# Patient Record
Sex: MALE | Race: WHITE | Employment: FULL TIME | URBAN - METROPOLITAN AREA
[De-identification: names, ages, dates, MRNs, and addresses within clinical notes are randomized per-mention and may not be internally consistent; named-entity substitution may affect disease eponyms.]

---

## 2023-10-08 ENCOUNTER — HOSPITAL ENCOUNTER (EMERGENCY)
Age: 53
Discharge: HOME | End: 2023-10-08
Attending: EMERGENCY MEDICINE
Payer: COMMERCIAL

## 2023-10-08 ENCOUNTER — APPOINTMENT (OUTPATIENT)
Dept: GENERAL RADIOLOGY | Age: 53
End: 2023-10-08
Payer: COMMERCIAL

## 2023-10-08 VITALS
HEART RATE: 93 BPM | BODY MASS INDEX: 18.48 KG/M2 | RESPIRATION RATE: 18 BRPM | WEIGHT: 115 LBS | OXYGEN SATURATION: 99 % | HEIGHT: 66 IN | DIASTOLIC BLOOD PRESSURE: 69 MMHG | SYSTOLIC BLOOD PRESSURE: 127 MMHG | TEMPERATURE: 98.1 F

## 2023-10-08 DIAGNOSIS — I49.3 PVC'S (PREMATURE VENTRICULAR CONTRACTIONS): ICD-10-CM

## 2023-10-08 DIAGNOSIS — R00.2 PALPITATIONS: Primary | ICD-10-CM

## 2023-10-08 LAB
ANION GAP SERPL CALCULATED.3IONS-SCNC: 9 MMOL/L (ref 7–16)
BASOPHILS # BLD: 0.01 K/UL (ref 0–0.2)
BASOPHILS NFR BLD: 0 % (ref 0–2)
BUN SERPL-MCNC: 7 MG/DL (ref 6–20)
CALCIUM SERPL-MCNC: 9.9 MG/DL (ref 8.6–10.2)
CHLORIDE SERPL-SCNC: 102 MMOL/L (ref 98–107)
CO2 SERPL-SCNC: 30 MMOL/L (ref 22–29)
CREAT SERPL-MCNC: 0.7 MG/DL (ref 0.7–1.2)
EOSINOPHIL # BLD: 0 K/UL (ref 0.05–0.5)
EOSINOPHILS RELATIVE PERCENT: 0 % (ref 0–6)
ERYTHROCYTE [DISTWIDTH] IN BLOOD BY AUTOMATED COUNT: 12.6 % (ref 11.5–15)
GFR SERPL CREATININE-BSD FRML MDRD: >60 ML/MIN/1.73M2
GLUCOSE SERPL-MCNC: 149 MG/DL (ref 74–99)
HCT VFR BLD AUTO: 44.7 % (ref 37–54)
HGB BLD-MCNC: 14.9 G/DL (ref 12.5–16.5)
IMM GRANULOCYTES # BLD AUTO: <0.03 K/UL (ref 0–0.58)
IMM GRANULOCYTES NFR BLD: 0 % (ref 0–5)
LYMPHOCYTES NFR BLD: 0.75 K/UL (ref 1.5–4)
LYMPHOCYTES RELATIVE PERCENT: 17 % (ref 20–42)
MCH RBC QN AUTO: 30.8 PG (ref 26–35)
MCHC RBC AUTO-ENTMCNC: 33.3 G/DL (ref 32–34.5)
MCV RBC AUTO: 92.5 FL (ref 80–99.9)
MONOCYTES NFR BLD: 0.31 K/UL (ref 0.1–0.95)
MONOCYTES NFR BLD: 7 % (ref 2–12)
NEUTROPHILS NFR BLD: 76 % (ref 43–80)
NEUTS SEG NFR BLD: 3.33 K/UL (ref 1.8–7.3)
PLATELET # BLD AUTO: 299 K/UL (ref 130–450)
PMV BLD AUTO: 9.7 FL (ref 7–12)
POTASSIUM SERPL-SCNC: 4.2 MMOL/L (ref 3.5–5)
RBC # BLD AUTO: 4.83 M/UL (ref 3.8–5.8)
SODIUM SERPL-SCNC: 141 MMOL/L (ref 132–146)
T4 FREE SERPL-MCNC: 1.5 NG/DL (ref 0.9–1.7)
TROPONIN I SERPL HS-MCNC: <6 NG/L (ref 0–11)
TSH SERPL DL<=0.05 MIU/L-ACNC: 1.74 UIU/ML (ref 0.27–4.2)
WBC OTHER # BLD: 4.4 K/UL (ref 4.5–11.5)

## 2023-10-08 PROCEDURE — 99285 EMERGENCY DEPT VISIT HI MDM: CPT

## 2023-10-08 PROCEDURE — 93005 ELECTROCARDIOGRAM TRACING: CPT | Performed by: EMERGENCY MEDICINE

## 2023-10-08 PROCEDURE — 84443 ASSAY THYROID STIM HORMONE: CPT

## 2023-10-08 PROCEDURE — 85025 COMPLETE CBC W/AUTO DIFF WBC: CPT

## 2023-10-08 PROCEDURE — 71045 X-RAY EXAM CHEST 1 VIEW: CPT

## 2023-10-08 PROCEDURE — 80048 BASIC METABOLIC PNL TOTAL CA: CPT

## 2023-10-08 PROCEDURE — 84439 ASSAY OF FREE THYROXINE: CPT

## 2023-10-08 PROCEDURE — 84484 ASSAY OF TROPONIN QUANT: CPT

## 2023-10-08 ASSESSMENT — ENCOUNTER SYMPTOMS
EYE REDNESS: 0
NAUSEA: 0
ABDOMINAL PAIN: 0
VOMITING: 0
SHORTNESS OF BREATH: 0

## 2023-10-08 ASSESSMENT — PAIN - FUNCTIONAL ASSESSMENT: PAIN_FUNCTIONAL_ASSESSMENT: NONE - DENIES PAIN

## 2023-10-08 NOTE — ED PROVIDER NOTES
[address] ENCOUNTER        Pt Name: Kyra Valentin  MRN: [id number]  [address] [facility] [place] 1970  Date of evaluation: 10/8/2023  Provider: Huan Arevalo DO  PCP: Cory Hernández MD  Note Started: 9:42 AM EDT 10/8/23    CHIEF COMPLAINT       Chief Complaint   Patient presents with    Irregular Heart Beat     Started Tuesday. HISTORY OF PRESENT ILLNESS: 1 or more Elements       Kyra Valentin is a 47 y.o. male who presents to the emergency department with a chief complaint of palpitations. The patient states that he began approximately 5 days ago with palpitations. He denies any particular chest pain. States he did have a very minor pain in the left upper quadrant of his abdomen and left lower chest which has resolved after only few seconds a few days ago. He states that the palpitations are intermittent. Nothing in particular makes them better or worse. He denies any associated shortness of breath. He denies any fevers, chills, nausea, vomiting. The patient states that he does drink a large amount of caffeine. He has no thyroid pathology. He has no history of hypertension, hyperlipidemia or diabetes. He is not a smoker. Nursing Notes were all reviewed and agreed with or any disagreements were addressed in the HPI. REVIEW OF SYSTEMS :      Review of Systems   Constitutional:  Negative for fever. HENT:  Negative for congestion. Eyes:  Negative for redness. Respiratory:  Negative for shortness of breath. Cardiovascular:  Positive for palpitations. Negative for chest pain. Gastrointestinal:  Negative for abdominal pain, nausea and vomiting. Genitourinary:  Negative for dysuria. Musculoskeletal:  Negative for arthralgias. Skin:  Negative for rash. Neurological:  Negative for dizziness. Psychiatric/Behavioral:  Negative for confusion.     All other systems reviewed and are

## 2023-10-09 LAB
EKG ATRIAL RATE: 83 BPM
EKG P AXIS: 82 DEGREES
EKG P-R INTERVAL: 110 MS
EKG Q-T INTERVAL: 360 MS
EKG QRS DURATION: 88 MS
EKG QTC CALCULATION (BAZETT): 423 MS
EKG R AXIS: 76 DEGREES
EKG T AXIS: 59 DEGREES
EKG VENTRICULAR RATE: 83 BPM

## 2023-10-09 PROCEDURE — 93010 ELECTROCARDIOGRAM REPORT: CPT | Performed by: INTERNAL MEDICINE

## 2024-01-17 NOTE — PROGRESS NOTES
[facility]IC[facility]- [facility] Electrophysiology  Consultation Report  PATIENT: Ari Etienne  MEDICAL RECORD NUMBER: [id number]  DATE OF SERVICE:  2024  ATTENDING ELECTROPHYSIOLOGIST: Lani Ashton MD  REFERRING PHYSICIAN:  Shabbir Arias MD  CHIEF COMPLAINT: Palpitations    HPI: This is a 53 y.o. male with a history of   Patient Active Problem List   Diagnosis    Thumb injury    Hand contusion    Fracture of finger, distal phalanx, left, closed   who presents to cardiac electrophysiology clinic for consultation of palpitations.  The patient states that his palpitations started in 2023 which was couple of days before he presented to the emergency room on  10/8/23 with complaints of palpitations.  An EKG at that time showed frequent PVCs. He was discharged home and was advised to follow up with PCP.  He reports feeling his palpitation went away after 2023 but had gradually increased in occurrence since early 2024. His episodes are sporadic and unaware of any notable triggers. He does report associated SOB during his episodes. He reports he has decreased his caffeine intake from 2 to 1 cup per day, which he reports made a difference in his symptoms. The patient presents today in SR w/ freq PVCs. The patient denies any chest pain, dizziness, syncope, orthopnea or paroxysmal nocturnal dyspnea. He denies any SCD in the family but reports maternal grandfather  from [place].       Patient Active Problem List    Diagnosis Date Noted    Thumb injury 2011    Hand contusion 2011    Fracture of finger, distal phalanx, left, closed 2011       History reviewed. No pertinent past medical history.    Family History   Problem Relation Age of Onset    Irritable Bowel Syndrome Father        Social History     Tobacco Use    Smoking status: Never    Smokeless tobacco: Never   Substance Use Topics    Alcohol use: No       No current outpatient

## 2024-01-18 ENCOUNTER — OFFICE VISIT (OUTPATIENT)
Dept: NON INVASIVE DIAGNOSTICS | Age: 54
End: 2024-01-18
Payer: COMMERCIAL

## 2024-01-18 VITALS
HEART RATE: 72 BPM | WEIGHT: 105.4 LBS | BODY MASS INDEX: 16.94 KG/M2 | HEIGHT: 66 IN | SYSTOLIC BLOOD PRESSURE: 102 MMHG | DIASTOLIC BLOOD PRESSURE: 70 MMHG | RESPIRATION RATE: 16 BRPM

## 2024-01-18 DIAGNOSIS — R94.31 EKG ABNORMALITIES: ICD-10-CM

## 2024-01-18 DIAGNOSIS — I49.9 IRREGULAR HEART BEAT: Primary | ICD-10-CM

## 2024-01-18 PROCEDURE — 99205 OFFICE O/P NEW HI 60 MIN: CPT | Performed by: INTERNAL MEDICINE

## 2024-01-18 PROCEDURE — 93000 ELECTROCARDIOGRAM COMPLETE: CPT | Performed by: INTERNAL MEDICINE

## 2024-01-18 NOTE — PROGRESS NOTES
Patient was seen today and ZIO XT 14 day monitor was placed.  Monitor was ordered by Dr Lani Ashton.  Monitor was applied and instructions given to patient.  Patient stated understanding and gave verbalized readback.    Monitor company: [employer] XT 14 day  Serial number : [id number]    Electronically signed by Jerilyn Bryan MA on 1/18/2024 at 8:19 AM

## 2024-01-25 ENCOUNTER — TELEPHONE (OUTPATIENT)
Dept: CARDIOLOGY | Age: 54
End: 2024-01-25

## 2024-01-25 NOTE — TELEPHONE ENCOUNTER
Spoke with patient and confirmed regular stress test appointment on January 29, 2024 at 0715. Instructions for test, and COVID-19 preprocedure information reviewed with patient, questions answered. Patient verbalized understanding. Also instructed to call office if unable to keep appointment.

## 2024-01-29 ENCOUNTER — HOSPITAL ENCOUNTER (OUTPATIENT)
Dept: CARDIOLOGY | Age: 54
Discharge: HOME | End: 2024-01-31
Attending: INTERNAL MEDICINE
Payer: COMMERCIAL

## 2024-01-29 VITALS
SYSTOLIC BLOOD PRESSURE: 102 MMHG | HEIGHT: 66 IN | RESPIRATION RATE: 16 BRPM | WEIGHT: 105 LBS | DIASTOLIC BLOOD PRESSURE: 78 MMHG | HEART RATE: 74 BPM | BODY MASS INDEX: 16.88 KG/M2

## 2024-01-29 LAB
ECHO BSA: 1.49 M2
STRESS BASELINE DIAS BP: 78 MMHG
STRESS BASELINE HR: 71 BPM
STRESS BASELINE SYS BP: 102 MMHG
STRESS ESTIMATED WORKLOAD: 13.3 METS
STRESS EXERCISE DUR MIN: 10 MIN
STRESS EXERCISE DUR SEC: 1 SEC
STRESS O2 SAT PEAK: 97 %
STRESS O2 SAT REST: 98 %
STRESS PEAK DIAS BP: 80 MMHG
STRESS PEAK SYS BP: 156 MMHG
STRESS PERCENT HR ACHIEVED: 96 %
STRESS POST PEAK HR: 162 BPM
STRESS RATE PRESSURE PRODUCT: NORMAL BPM*MMHG
STRESS TARGET HR: 168 BPM

## 2024-01-29 PROCEDURE — 93016 CV STRESS TEST SUPVJ ONLY: CPT | Performed by: INTERNAL MEDICINE

## 2024-01-29 PROCEDURE — 93017 CV STRESS TEST TRACING ONLY: CPT

## 2024-01-29 PROCEDURE — 93018 CV STRESS TEST I&R ONLY: CPT | Performed by: INTERNAL MEDICINE

## 2024-01-30 ENCOUNTER — TELEPHONE (OUTPATIENT)
Dept: NON INVASIVE DIAGNOSTICS | Age: 54
End: 2024-01-30

## 2024-01-30 NOTE — TELEPHONE ENCOUNTER
----- Message from Lani Ashton MD sent at 1/29/2024  5:41 PM EST -----  Stress test is normal. Thanks.  ----- Message -----  From: Esa Best MD  Sent: 1/29/2024   1:53 PM EST  To: Lani Ashton MD

## 2024-02-13 ENCOUNTER — TELEPHONE (OUTPATIENT)
Dept: NON INVASIVE DIAGNOSTICS | Age: 54
End: 2024-02-13

## 2024-02-13 DIAGNOSIS — R94.31 EKG ABNORMALITIES: ICD-10-CM

## 2024-02-13 NOTE — TELEPHONE ENCOUNTER
----- Message from Lani Ashton MD sent at 2/13/2024 11:37 AM EST -----  Predominant underlying rhythm was Sinus Rhythm. 3 Supraventricular Tachycardia runs occurred, the run with the longest lasting 6 beats with an avg rate of 145 bpm. Isolated SVEs were occasional (1.2%, 43077). Isolated VEs were frequent (9.6%, [id number]). No VT or PAF. No  pause or AV block. Reported symptoms were correlated with sinus rhythm, PACs and PVCs. Start Toprol Xl 25 mg daily if he agrees. Thanks.  ----- Message -----  From: Zuleima Faria MA  Sent: 2/13/2024  10:22 AM EST  To: Lani Ashton MD

## 2024-02-13 NOTE — TELEPHONE ENCOUNTER
Pt notified of results and verbalized understanding.  Patient wants to wait until ECHO results to consider starting Toprol XL.     Electronically signed by Mary Faria MA on 2/13/2024 at 12:22 PM

## 2024-02-13 NOTE — RESULT ENCOUNTER NOTE
The patient wants to hold off on starting Toprol XL until further testing is complete. He feels good and episodes seem to have subsided since the holidays are over.     Electronically signed by Mary Faria MA on 2/13/2024 at 12:26 PM

## 2024-03-07 ENCOUNTER — HOSPITAL ENCOUNTER (OUTPATIENT)
Dept: CARDIOLOGY | Age: 54
Discharge: HOME | End: 2024-03-09
Attending: INTERNAL MEDICINE
Payer: COMMERCIAL

## 2024-03-07 VITALS
DIASTOLIC BLOOD PRESSURE: 78 MMHG | WEIGHT: 105 LBS | BODY MASS INDEX: 16.88 KG/M2 | HEIGHT: 66 IN | SYSTOLIC BLOOD PRESSURE: 102 MMHG

## 2024-03-07 LAB
ECHO AO ASC DIAM: 2.9 CM
ECHO AO ASCENDING AORTA INDEX: 1.91 CM/M2
ECHO AV AREA PEAK VELOCITY: 2.5 CM2
ECHO AV AREA VTI: 2.3 CM2
ECHO AV AREA/BSA PEAK VELOCITY: 1.6 CM2/M2
ECHO AV AREA/BSA VTI: 1.5 CM2/M2
ECHO AV CUSP MM: 1.8 CM
ECHO AV MEAN GRADIENT: 2 MMHG
ECHO AV MEAN VELOCITY: 0.7 M/S
ECHO AV PEAK GRADIENT: 4 MMHG
ECHO AV PEAK VELOCITY: 0.9 M/S
ECHO AV VELOCITY RATIO: 0.78
ECHO AV VTI: 21 CM
ECHO BSA: 1.49 M2
ECHO EST RA PRESSURE: 3 MMHG
ECHO LA DIAMETER INDEX: 1.78 CM/M2
ECHO LA DIAMETER: 2.7 CM
ECHO LA VOL A-L A2C: 31 ML (ref 18–58)
ECHO LA VOL A-L A4C: 24 ML (ref 18–58)
ECHO LA VOL MOD A2C: 28 ML (ref 18–58)
ECHO LA VOL MOD A4C: 21 ML (ref 18–58)
ECHO LA VOLUME AREA LENGTH: 30 ML
ECHO LA VOLUME INDEX A-L A2C: 20 ML/M2 (ref 16–34)
ECHO LA VOLUME INDEX A-L A4C: 16 ML/M2 (ref 16–34)
ECHO LA VOLUME INDEX AREA LENGTH: 20 ML/M2 (ref 16–34)
ECHO LA VOLUME INDEX MOD A2C: 18 ML/M2 (ref 16–34)
ECHO LA VOLUME INDEX MOD A4C: 14 ML/M2 (ref 16–34)
ECHO LV FRACTIONAL SHORTENING: 31 % (ref 28–44)
ECHO LV INTERNAL DIMENSION DIASTOLE INDEX: 2.76 CM/M2
ECHO LV INTERNAL DIMENSION DIASTOLIC: 4.2 CM (ref 4.2–5.9)
ECHO LV INTERNAL DIMENSION SYSTOLIC INDEX: 1.91 CM/M2
ECHO LV INTERNAL DIMENSION SYSTOLIC: 2.9 CM
ECHO LV ISOVOLUMETRIC RELAXATION TIME (IVRT): 96.9 MS
ECHO LV IVSD: 0.7 CM (ref 0.6–1)
ECHO LV IVSS: 1 CM
ECHO LV MASS 2D: 85.1 G (ref 88–224)
ECHO LV MASS INDEX 2D: 56 G/M2 (ref 49–115)
ECHO LV POSTERIOR WALL DIASTOLIC: 0.7 CM (ref 0.6–1)
ECHO LV POSTERIOR WALL SYSTOLIC: 1.1 CM
ECHO LV RELATIVE WALL THICKNESS RATIO: 0.33
ECHO LVOT AREA: 3.1 CM2
ECHO LVOT AV VTI INDEX: 0.76
ECHO LVOT DIAM: 2 CM
ECHO LVOT MEAN GRADIENT: 1 MMHG
ECHO LVOT PEAK GRADIENT: 2 MMHG
ECHO LVOT PEAK VELOCITY: 0.7 M/S
ECHO LVOT STROKE VOLUME INDEX: 32.8 ML/M2
ECHO LVOT SV: 49.9 ML
ECHO LVOT VTI: 15.9 CM
ECHO MV "A" WAVE DURATION: 120 MSEC
ECHO MV A VELOCITY: 0.47 M/S
ECHO MV AREA PHT: 2.5 CM2
ECHO MV AREA VTI: 2.7 CM2
ECHO MV E DECELERATION TIME (DT): 208.5 MS
ECHO MV E VELOCITY: 0.71 M/S
ECHO MV E/A RATIO: 1.51
ECHO MV LVOT VTI INDEX: 1.17
ECHO MV MAX VELOCITY: 0.7 M/S
ECHO MV MEAN GRADIENT: 1 MMHG
ECHO MV MEAN VELOCITY: 0.4 M/S
ECHO MV PEAK GRADIENT: 2 MMHG
ECHO MV PRESSURE HALF TIME (PHT): 87.1 MS
ECHO MV VTI: 18.6 CM
ECHO PV MAX VELOCITY: 0.8 M/S
ECHO PV MEAN GRADIENT: 2 MMHG
ECHO PV MEAN VELOCITY: 0.6 M/S
ECHO PV PEAK GRADIENT: 3 MMHG
ECHO PV VTI: 18.5 CM
ECHO RIGHT VENTRICULAR SYSTOLIC PRESSURE (RVSP): 23 MMHG
ECHO RV INTERNAL DIMENSION: 3 CM
ECHO TV REGURGITANT MAX VELOCITY: 2.25 M/S
ECHO TV REGURGITANT PEAK GRADIENT: 20 MMHG

## 2024-03-07 PROCEDURE — 93306 TTE W/DOPPLER COMPLETE: CPT | Performed by: INTERNAL MEDICINE

## 2024-03-07 PROCEDURE — 93306 TTE W/DOPPLER COMPLETE: CPT

## 2024-03-08 ENCOUNTER — TELEPHONE (OUTPATIENT)
Dept: NON INVASIVE DIAGNOSTICS | Age: 54
End: 2024-03-08

## 2024-03-08 RX ORDER — METOPROLOL SUCCINATE 25 MG/1
25 TABLET, EXTENDED RELEASE ORAL DAILY
COMMUNITY
End: 2024-03-08 | Stop reason: SDUPTHER

## 2024-03-08 RX ORDER — METOPROLOL SUCCINATE 25 MG/1
25 TABLET, EXTENDED RELEASE ORAL DAILY
Qty: 90 TABLET | Refills: 1 | Status: SHIPPED | OUTPATIENT
Start: 2024-03-08

## 2024-03-08 NOTE — TELEPHONE ENCOUNTER
----- Message from Lani Ashton MD sent at 3/7/2024  5:56 PM EST -----  LV EF 50% . PVC burden 9.6%. Start Toprol XL 25 mg daily if he agrees. Thanks.  ----- Message -----  From: Daniella Atkinson DO  Sent: 3/7/2024   5:50 PM EST  To: Lani Ashton MD

## 2024-03-08 NOTE — TELEPHONE ENCOUNTER
Pt notified of results and verbalized understanding.    The patient agrees to start the Toprol XL 25 MG QD. The patient requested the medication be sent to [facility] in [place].     Electronically signed by Mary Faria MA on 3/8/2024 at 9:08 AM

## 2024-07-18 ENCOUNTER — OFFICE VISIT (OUTPATIENT)
Dept: NON INVASIVE DIAGNOSTICS | Age: 54
End: 2024-07-18
Payer: COMMERCIAL

## 2024-07-18 VITALS
WEIGHT: 104 LBS | HEART RATE: 50 BPM | OXYGEN SATURATION: 96 % | DIASTOLIC BLOOD PRESSURE: 60 MMHG | HEIGHT: 65 IN | BODY MASS INDEX: 17.33 KG/M2 | SYSTOLIC BLOOD PRESSURE: 90 MMHG | RESPIRATION RATE: 16 BRPM

## 2024-07-18 DIAGNOSIS — I51.9 HEART PROBLEM: Primary | ICD-10-CM

## 2024-07-18 DIAGNOSIS — I49.3 PVC (PREMATURE VENTRICULAR CONTRACTION): ICD-10-CM

## 2024-07-18 PROCEDURE — 99214 OFFICE O/P EST MOD 30 MIN: CPT | Performed by: NURSE PRACTITIONER

## 2024-07-18 PROCEDURE — 93000 ELECTROCARDIOGRAM COMPLETE: CPT | Performed by: INTERNAL MEDICINE

## 2024-07-18 RX ORDER — METOPROLOL SUCCINATE 25 MG/1
25 TABLET, EXTENDED RELEASE ORAL DAILY
Qty: 90 TABLET | Refills: 3 | Status: SHIPPED | OUTPATIENT
Start: 2024-07-18

## 2024-07-18 NOTE — PATIENT INSTRUCTIONS
Continue to take your Toprol prior to bed.  If you note any episodes of lightheadedness and almost passing out please let us known.

## 2024-07-18 NOTE — PROGRESS NOTES
Toprol 25mg nightly.   Ongoing monitoring of LVEF and PVC burden.   Advised to decrease Caffeine intake.  Follow up in 6 months with Dr. Ashton or sooner PRN. Encouraged the patient to call the office for any questions or concerns.    I have spent a total of 30  minutes with the patient and the family reviewing the above stated recommendations.  And a total of >50% of that time involved face-to-face time providing counseling and or coordination of care with the other providers, preparation for the clinic visit, reviewing records/tests, counseling/education of the patient, ordering medications/tests/procedures, coordinating care, and documenting clinical information in the EHR.     Thank you for allowing me to participate in your patient's care.  Please call me if there are any questions or concerns.      Shabbir Rao, APRN - CNP   Cardiac Electrophysiology  [facility]  [facility] [place]: [place] [facility]  7/18/24   8:16 AM

## 2025-02-11 ENCOUNTER — OFFICE VISIT (OUTPATIENT)
Age: 55
End: 2025-02-11
Payer: COMMERCIAL

## 2025-02-11 VITALS
BODY MASS INDEX: 17.19 KG/M2 | TEMPERATURE: 97.7 F | RESPIRATION RATE: 18 BRPM | HEIGHT: 66 IN | WEIGHT: 107 LBS | HEART RATE: 60 BPM | DIASTOLIC BLOOD PRESSURE: 68 MMHG | OXYGEN SATURATION: 99 % | SYSTOLIC BLOOD PRESSURE: 110 MMHG

## 2025-02-11 DIAGNOSIS — N50.812 PAIN IN LEFT TESTICLE: ICD-10-CM

## 2025-02-11 DIAGNOSIS — Z12.5 SCREENING FOR PROSTATE CANCER: ICD-10-CM

## 2025-02-11 DIAGNOSIS — I49.3 PVC (PREMATURE VENTRICULAR CONTRACTION): ICD-10-CM

## 2025-02-11 DIAGNOSIS — R53.83 OTHER FATIGUE: ICD-10-CM

## 2025-02-11 DIAGNOSIS — Z00.00 WELL ADULT EXAM: Primary | ICD-10-CM

## 2025-02-11 LAB
BILIRUBIN, POC: NORMAL
BLOOD URINE, POC: NORMAL
CLARITY, POC: CLEAR
COLOR, POC: NORMAL
GLUCOSE URINE, POC: NORMAL MG/DL
KETONES, POC: NORMAL MG/DL
LEUKOCYTE EST, POC: NORMAL
NITRITE, POC: NORMAL
PH, POC: 6
PROTEIN, POC: NORMAL MG/DL
SPECIFIC GRAVITY, POC: 1.01
UROBILINOGEN, POC: 0.2 MG/DL

## 2025-02-11 PROCEDURE — 81002 URINALYSIS NONAUTO W/O SCOPE: CPT | Performed by: FAMILY MEDICINE

## 2025-02-11 PROCEDURE — 99396 PREV VISIT EST AGE 40-64: CPT | Performed by: FAMILY MEDICINE

## 2025-02-11 SDOH — ECONOMIC STABILITY: FOOD INSECURITY: WITHIN THE PAST 12 MONTHS, YOU WORRIED THAT YOUR FOOD WOULD RUN OUT BEFORE YOU GOT MONEY TO BUY MORE.: NEVER TRUE

## 2025-02-11 SDOH — ECONOMIC STABILITY: FOOD INSECURITY: WITHIN THE PAST 12 MONTHS, THE FOOD YOU BOUGHT JUST DIDN'T LAST AND YOU DIDN'T HAVE MONEY TO GET MORE.: NEVER TRUE

## 2025-02-11 ASSESSMENT — PATIENT HEALTH QUESTIONNAIRE - PHQ9
SUM OF ALL RESPONSES TO PHQ QUESTIONS 1-9: 0
SUM OF ALL RESPONSES TO PHQ9 QUESTIONS 1 & 2: 0
SUM OF ALL RESPONSES TO PHQ QUESTIONS 1-9: 0
SUM OF ALL RESPONSES TO PHQ QUESTIONS 1-9: 0
1. LITTLE INTEREST OR PLEASURE IN DOING THINGS: NOT AT ALL
SUM OF ALL RESPONSES TO PHQ QUESTIONS 1-9: 0
2. FEELING DOWN, DEPRESSED OR HOPELESS: NOT AT ALL

## 2025-02-11 ASSESSMENT — ENCOUNTER SYMPTOMS
GASTROINTESTINAL NEGATIVE: 1
RESPIRATORY NEGATIVE: 1

## 2025-02-11 NOTE — PROGRESS NOTES
Well Adult Note  Name: Ari Etienne Today’s Date: 2025   MRN: [id number] Sex: Male   Age: 54 y.o. Ethnicity: Non- / Non    : 1970 Race: White (non-)      Ari Etienne is here for a well adult exam.       Assessment & Plan   Well adult exam  -     POCT Urinalysis no Micro  -     CBC with Auto Differential; Future  -     Comprehensive Metabolic Panel; Future  -     PSA Screening; Future  -     TSH; Future  PVC (premature ventricular contraction) stable on med follows with the EP he is due for an appointment he will call to schedule  Screening for prostate cancer  -     PSA Screening; Future  Other fatigue  -     CBC with Auto Differential; Future  -     TSH; Future  Pain in left testicle  -     US SCROTUM AND TESTICLES; Future        No follow-ups on file.       Subjective   History:  54-year-old comes in for his annual physical.  Previously diagnosed with PVCs underwent cardiac workup including normal stress test and normal 2D echo he was placed on Metroprolol with significant improvement of the PVCs.  He does follow with electrophysiology he is due for an appointment now.  Also had a positive Cologuard underwent colonoscopy last May perfectly normal will be due again in .  Planes of some left testicular discomfort denies any trauma.    Review of Systems   Constitutional:  Positive for fatigue. Negative for activity change and appetite change.   HENT: Negative.     Respiratory: Negative.     Cardiovascular: Negative.  Negative for palpitations.   Gastrointestinal: Negative.    Genitourinary:  Positive for testicular pain.   Musculoskeletal: Negative.    Neurological:  Positive for light-headedness.   Psychiatric/Behavioral: Negative.         No Known Allergies  Prior to Visit Medications    Medication Sig Taking? Authorizing Provider   metoprolol succinate (TOPROL XL) 25 MG extended release tablet Take 1 tablet by mouth daily Yes Shabbir Rao, APRN -

## 2025-03-10 NOTE — PROGRESS NOTES
[facility] PHYSICIANS- [facility] Electrophysiology  Outpatient Progress Report  PATIENT: Ari Etienne  MEDICAL RECORD NUMBER: [id number]  DATE OF SERVICE:  3/11/2025  ATTENDING ELECTROPHYSIOLOGIST: Lani Ashton MD  REFERRING PHYSICIAN:  Shabbir Arias MD  CHIEF COMPLAINT: Palpitations    HPI: This is a 54 y.o. male with a history of   Patient Active Problem List   Diagnosis    Thumb injury    Hand contusion    Fracture of finger, distal phalanx, left, closed    PVC (premature ventricular contraction)    Encounter for well adult exam without abnormal findings    Screening for prostate cancer    Other fatigue    Pain in left testicle   who presents to cardiac electrophysiology clinic for follow up of palpitations/PVCs.  The patient states that his palpitations started in October 2023 which was couple of days before he presented to the emergency room on  10/8/23 with complaints of palpitations.  An EKG at that time showed frequent PVCs.His episodes are sporadic and unaware of any notable triggers. He does report associated SOB during his episodes. He reports he has decreased his caffeine intake from 2 to 1 cup per day, which he reports made a difference in his symptoms. In Feb 2024, he underwent a 3 day monitor that showed a 9.6% PVC burden, echo showing normal LVEF and stress test that was low risk. He was started on Toprol 03/08/24. He followed with KATHLEEN Mancilla in July 2024 and reported after starting Toprol XL he notes resolution of palpitation but reports having slight fatigue, and lightheadedness when standing up quickly from a laying position. Today he presents and reports feeling overall well after taking his Toprol at night. He does report having one episode of palpitations around 2 and a half week ago, while at the gym, that lasted 4 minutes and resolved on its own. His EKG today shows SB. The patient denies any chest pain, dyspnea, dizziness, syncope,

## 2025-03-11 ENCOUNTER — OFFICE VISIT (OUTPATIENT)
Dept: NON INVASIVE DIAGNOSTICS | Age: 55
End: 2025-03-11
Payer: COMMERCIAL

## 2025-03-11 VITALS
SYSTOLIC BLOOD PRESSURE: 104 MMHG | DIASTOLIC BLOOD PRESSURE: 64 MMHG | HEART RATE: 56 BPM | RESPIRATION RATE: 16 BRPM | WEIGHT: 104.4 LBS | HEIGHT: 67 IN | OXYGEN SATURATION: 99 % | BODY MASS INDEX: 16.39 KG/M2 | TEMPERATURE: 98.4 F

## 2025-03-11 DIAGNOSIS — R94.31 EKG ABNORMALITIES: ICD-10-CM

## 2025-03-11 DIAGNOSIS — R53.83 OTHER FATIGUE: ICD-10-CM

## 2025-03-11 DIAGNOSIS — Z00.00 WELL ADULT EXAM: ICD-10-CM

## 2025-03-11 DIAGNOSIS — R00.2 PALPITATIONS: ICD-10-CM

## 2025-03-11 DIAGNOSIS — I51.9 HEART PROBLEM: Primary | ICD-10-CM

## 2025-03-11 DIAGNOSIS — Z12.5 SCREENING FOR PROSTATE CANCER: ICD-10-CM

## 2025-03-11 LAB
ALBUMIN: 4.4 G/DL (ref 3.5–5.2)
ALP BLD-CCNC: 64 U/L (ref 40–129)
ALT SERPL-CCNC: 6 U/L (ref 0–40)
ANION GAP SERPL CALCULATED.3IONS-SCNC: 15 MMOL/L (ref 7–16)
AST SERPL-CCNC: 14 U/L (ref 0–39)
BASOPHILS ABSOLUTE: 0.02 K/UL (ref 0–0.2)
BASOPHILS RELATIVE PERCENT: 1 % (ref 0–2)
BILIRUB SERPL-MCNC: 0.4 MG/DL (ref 0–1.2)
BUN BLDV-MCNC: 11 MG/DL (ref 6–20)
CALCIUM SERPL-MCNC: 9.1 MG/DL (ref 8.6–10.2)
CHLORIDE BLD-SCNC: 101 MMOL/L (ref 98–107)
CO2: 24 MMOL/L (ref 22–29)
CREAT SERPL-MCNC: 0.8 MG/DL (ref 0.7–1.2)
EOSINOPHILS ABSOLUTE: 0.05 K/UL (ref 0.05–0.5)
EOSINOPHILS RELATIVE PERCENT: 1 % (ref 0–6)
GFR, ESTIMATED: >90 ML/MIN/1.73M2
GLUCOSE BLD-MCNC: 102 MG/DL (ref 74–99)
HCT VFR BLD CALC: 42.9 % (ref 37–54)
HEMOGLOBIN: 13.7 G/DL (ref 12.5–16.5)
IMMATURE GRANULOCYTES %: 0 % (ref 0–5)
IMMATURE GRANULOCYTES ABSOLUTE: <0.03 K/UL (ref 0–0.58)
LYMPHOCYTES ABSOLUTE: 1.19 K/UL (ref 1.5–4)
LYMPHOCYTES RELATIVE PERCENT: 31 % (ref 20–42)
MCH RBC QN AUTO: 31.2 PG (ref 26–35)
MCHC RBC AUTO-ENTMCNC: 31.9 G/DL (ref 32–34.5)
MCV RBC AUTO: 97.7 FL (ref 80–99.9)
MONOCYTES ABSOLUTE: 0.44 K/UL (ref 0.1–0.95)
MONOCYTES RELATIVE PERCENT: 11 % (ref 2–12)
NEUTROPHILS ABSOLUTE: 2.19 K/UL (ref 1.8–7.3)
NEUTROPHILS RELATIVE PERCENT: 56 % (ref 43–80)
PDW BLD-RTO: 12.8 % (ref 11.5–15)
PLATELET # BLD: 245 K/UL (ref 130–450)
PMV BLD AUTO: 10.8 FL (ref 7–12)
POTASSIUM SERPL-SCNC: 4.3 MMOL/L (ref 3.5–5)
PROSTATE SPECIFIC ANTIGEN: 3.35 NG/ML (ref 0–4)
RBC # BLD: 4.39 M/UL (ref 3.8–5.8)
SODIUM BLD-SCNC: 140 MMOL/L (ref 132–146)
TOTAL PROTEIN: 7.3 G/DL (ref 6.4–8.3)
TSH SERPL DL<=0.05 MIU/L-ACNC: 1.58 UIU/ML (ref 0.27–4.2)
WBC # BLD: 3.9 K/UL (ref 4.5–11.5)

## 2025-03-11 PROCEDURE — 99215 OFFICE O/P EST HI 40 MIN: CPT | Performed by: INTERNAL MEDICINE

## 2025-03-11 PROCEDURE — 93000 ELECTROCARDIOGRAM COMPLETE: CPT | Performed by: INTERNAL MEDICINE

## 2025-03-11 NOTE — PROGRESS NOTES
Patient was seen today and ZIO XT 14 day monitor was placed.  Monitor was ordered by Dr Lani Ashton.  Monitor was applied and instructions given to patient.  Patient stated understanding and gave verbalized readback.    Monitor company: [email or web address] XT 14 day  Serial number : [id number]    Electronically signed by COLETTE CALDWELL MA on 3/11/2025 at 11:41 AM

## 2025-03-13 PROBLEM — Z00.00 ENCOUNTER FOR WELL ADULT EXAM WITHOUT ABNORMAL FINDINGS: Status: RESOLVED | Noted: 2025-02-11 | Resolved: 2025-03-13

## 2025-03-13 PROBLEM — Z12.5 SCREENING FOR PROSTATE CANCER: Status: RESOLVED | Noted: 2025-02-11 | Resolved: 2025-03-13

## 2025-03-27 ENCOUNTER — HOSPITAL ENCOUNTER (OUTPATIENT)
Dept: CARDIOLOGY | Age: 55
Discharge: HOME | End: 2025-03-29
Attending: INTERNAL MEDICINE
Payer: COMMERCIAL

## 2025-03-27 VITALS
HEIGHT: 65 IN | DIASTOLIC BLOOD PRESSURE: 64 MMHG | SYSTOLIC BLOOD PRESSURE: 104 MMHG | BODY MASS INDEX: 17.33 KG/M2 | WEIGHT: 104 LBS

## 2025-03-27 DIAGNOSIS — R94.31 EKG ABNORMALITIES: ICD-10-CM

## 2025-03-27 LAB
ECHO AO ASC DIAM: 2.9 CM
ECHO AO ASCENDING AORTA INDEX: 1.93 CM/M2
ECHO AV AREA PEAK VELOCITY: 2.4 CM2
ECHO AV AREA VTI: 2.5 CM2
ECHO AV AREA/BSA PEAK VELOCITY: 1.6 CM2/M2
ECHO AV AREA/BSA VTI: 1.7 CM2/M2
ECHO AV CUSP MM: 2 CM
ECHO AV MEAN GRADIENT: 2 MMHG
ECHO AV MEAN VELOCITY: 0.7 M/S
ECHO AV PEAK GRADIENT: 4 MMHG
ECHO AV PEAK VELOCITY: 1 M/S
ECHO AV VELOCITY RATIO: 0.8
ECHO AV VTI: 21.7 CM
ECHO BSA: 1.47 M2
ECHO EST RA PRESSURE: 3 MMHG
ECHO LA DIAMETER INDEX: 1.87 CM/M2
ECHO LA DIAMETER: 2.8 CM
ECHO LA VOL A-L A2C: 36 ML (ref 18–58)
ECHO LA VOL A-L A4C: 25 ML (ref 18–58)
ECHO LA VOL MOD A2C: 28 ML (ref 18–58)
ECHO LA VOL MOD A4C: 22 ML (ref 18–58)
ECHO LA VOLUME AREA LENGTH: 30 ML
ECHO LA VOLUME INDEX A-L A2C: 24 ML/M2 (ref 16–34)
ECHO LA VOLUME INDEX A-L A4C: 17 ML/M2 (ref 16–34)
ECHO LA VOLUME INDEX AREA LENGTH: 20 ML/M2 (ref 16–34)
ECHO LA VOLUME INDEX MOD A2C: 19 ML/M2 (ref 16–34)
ECHO LA VOLUME INDEX MOD A4C: 15 ML/M2 (ref 16–34)
ECHO LV EF PHYSICIAN: 50 %
ECHO LV FRACTIONAL SHORTENING: 30 % (ref 28–44)
ECHO LV INTERNAL DIMENSION DIASTOLE INDEX: 3.13 CM/M2
ECHO LV INTERNAL DIMENSION DIASTOLIC: 4.7 CM (ref 4.2–5.9)
ECHO LV INTERNAL DIMENSION SYSTOLIC INDEX: 2.2 CM/M2
ECHO LV INTERNAL DIMENSION SYSTOLIC: 3.3 CM
ECHO LV IVSD: 0.6 CM (ref 0.6–1)
ECHO LV IVSS: 0.9 CM
ECHO LV MASS 2D: 85.1 G (ref 88–224)
ECHO LV MASS INDEX 2D: 56.7 G/M2 (ref 49–115)
ECHO LV POSTERIOR WALL DIASTOLIC: 0.6 CM (ref 0.6–1)
ECHO LV POSTERIOR WALL SYSTOLIC: 1 CM
ECHO LV RELATIVE WALL THICKNESS RATIO: 0.26
ECHO LVOT AREA: 3.1 CM2
ECHO LVOT AV VTI INDEX: 0.82
ECHO LVOT DIAM: 2 CM
ECHO LVOT MEAN GRADIENT: 1 MMHG
ECHO LVOT PEAK GRADIENT: 2 MMHG
ECHO LVOT PEAK VELOCITY: 0.8 M/S
ECHO LVOT STROKE VOLUME INDEX: 37.1 ML/M2
ECHO LVOT SV: 55.6 ML
ECHO LVOT VTI: 17.7 CM
ECHO MV "A" WAVE DURATION: 69.2 MSEC
ECHO MV A VELOCITY: 0.43 M/S
ECHO MV AREA PHT: 2.2 CM2
ECHO MV AREA VTI: 1.9 CM2
ECHO MV E DECELERATION TIME (DT): 326.8 MS
ECHO MV E VELOCITY: 0.64 M/S
ECHO MV E/A RATIO: 1.49
ECHO MV LVOT VTI INDEX: 1.64
ECHO MV MAX VELOCITY: 0.9 M/S
ECHO MV MEAN GRADIENT: 1 MMHG
ECHO MV MEAN VELOCITY: 0.5 M/S
ECHO MV PEAK GRADIENT: 3 MMHG
ECHO MV PRESSURE HALF TIME (PHT): 100 MS
ECHO MV VTI: 29.1 CM
ECHO PULMONARY ARTERY SYSTOLIC PRESSURE (PASP): 22 MMHG
ECHO PV MAX VELOCITY: 0.7 M/S
ECHO PV MEAN GRADIENT: 1 MMHG
ECHO PV MEAN VELOCITY: 0.4 M/S
ECHO PV PEAK GRADIENT: 2 MMHG
ECHO PV VTI: 15.8 CM
ECHO PVEIN A DURATION: 100.4 MS
ECHO PVEIN A VELOCITY: 0.2 M/S
ECHO PVEIN PEAK D VELOCITY: 0.6 M/S
ECHO PVEIN PEAK S VELOCITY: 0.6 M/S
ECHO PVEIN S/D RATIO: 1
ECHO RIGHT VENTRICULAR SYSTOLIC PRESSURE (RVSP): 22 MMHG
ECHO RV INTERNAL DIMENSION: 2.3 CM
ECHO TV REGURGITANT MAX VELOCITY: 2.19 M/S
ECHO TV REGURGITANT PEAK GRADIENT: 19 MMHG

## 2025-03-27 PROCEDURE — 93306 TTE W/DOPPLER COMPLETE: CPT

## 2025-03-28 ENCOUNTER — RESULTS FOLLOW-UP (OUTPATIENT)
Dept: NON INVASIVE DIAGNOSTICS | Age: 55
End: 2025-03-28

## 2025-03-28 NOTE — TELEPHONE ENCOUNTER
Pt notified of results and verbalized understanding.    Electronically signed by Mary Faria MA on 3/28/2025 at 9:30 AM

## 2025-03-28 NOTE — TELEPHONE ENCOUNTER
----- Message from Dr. Lani Ashton MD sent at 3/28/2025  7:16 AM EDT -----  Echo showed preserved LV function at 50-55%. Continue current treatment. Thanks.  ----- Message -----  From: Jose Hutson MD  Sent: 3/27/2025   5:25 PM EDT  To: Lani Ashton MD

## 2025-03-31 ENCOUNTER — RESULTS FOLLOW-UP (OUTPATIENT)
Dept: NON INVASIVE DIAGNOSTICS | Age: 55
End: 2025-03-31

## 2025-03-31 DIAGNOSIS — R00.2 PALPITATIONS: ICD-10-CM

## 2025-03-31 NOTE — TELEPHONE ENCOUNTER
----- Message from Dr. Lani Ashton MD sent at 3/31/2025  7:24 AM EDT -----  Monitor showed 1 brief SVT of 4 beats and PVC burden < 1%. Continue current treatment. Thanks.  ----- Message -----  From: Lizeth Lees MA  Sent: 3/31/2025   6:46 AM EDT  To: Lani Ashton MD

## 2025-04-17 ENCOUNTER — RESULTS FOLLOW-UP (OUTPATIENT)
Age: 55
End: 2025-04-17

## 2025-04-17 DIAGNOSIS — R97.20 INCREASED PROSTATE SPECIFIC ANTIGEN (PSA) VELOCITY: Primary | ICD-10-CM

## 2025-05-23 DIAGNOSIS — R97.20 INCREASED PROSTATE SPECIFIC ANTIGEN (PSA) VELOCITY: ICD-10-CM

## 2025-05-23 LAB — PROSTATE SPECIFIC ANTIGEN: 3.2 NG/ML (ref 0–4)

## 2025-06-04 ENCOUNTER — RESULTS FOLLOW-UP (OUTPATIENT)
Age: 55
End: 2025-06-04

## 2025-08-14 RX ORDER — METOPROLOL SUCCINATE 25 MG/1
25 TABLET, EXTENDED RELEASE ORAL DAILY
Qty: 90 TABLET | Refills: 3 | Status: SHIPPED | OUTPATIENT
Start: 2025-08-14